# Patient Record
Sex: MALE
[De-identification: names, ages, dates, MRNs, and addresses within clinical notes are randomized per-mention and may not be internally consistent; named-entity substitution may affect disease eponyms.]

---

## 2020-08-17 PROBLEM — Z00.00 ENCOUNTER FOR PREVENTIVE HEALTH EXAMINATION: Status: ACTIVE | Noted: 2020-08-17

## 2020-09-14 ENCOUNTER — APPOINTMENT (OUTPATIENT)
Dept: PEDIATRIC ALLERGY IMMUNOLOGY | Facility: CLINIC | Age: 55
End: 2020-09-14

## 2020-09-24 ENCOUNTER — APPOINTMENT (OUTPATIENT)
Dept: PEDIATRIC ALLERGY IMMUNOLOGY | Facility: CLINIC | Age: 55
End: 2020-09-24
Payer: COMMERCIAL

## 2020-09-24 VITALS — HEART RATE: 80 BPM | OXYGEN SATURATION: 94 % | RESPIRATION RATE: 16 BRPM

## 2020-09-24 DIAGNOSIS — J45.909 UNSPECIFIED ASTHMA, UNCOMPLICATED: ICD-10-CM

## 2020-09-24 DIAGNOSIS — K20.0 EOSINOPHILIC ESOPHAGITIS: ICD-10-CM

## 2020-09-24 DIAGNOSIS — Z23 ENCOUNTER FOR IMMUNIZATION: ICD-10-CM

## 2020-09-24 PROCEDURE — 90471 IMMUNIZATION ADMIN: CPT

## 2020-09-24 PROCEDURE — 99213 OFFICE O/P EST LOW 20 MIN: CPT | Mod: 25

## 2020-09-24 PROCEDURE — 90686 IIV4 VACC NO PRSV 0.5 ML IM: CPT

## 2020-09-24 RX ORDER — ATORVASTATIN CALCIUM 10 MG/1
10 TABLET, FILM COATED ORAL
Refills: 0 | Status: ACTIVE | COMMUNITY

## 2020-09-24 RX ORDER — AMLODIPINE BESYLATE 2.5 MG/1
2.5 TABLET ORAL
Refills: 0 | Status: ACTIVE | COMMUNITY

## 2020-09-24 NOTE — PHYSICAL EXAM
[Alert] : alert [No Acute Distress] : no acute distress [Normal Pupil & Iris Size/Symmetry] : normal pupil and iris size and symmetry [No Discharge] : no discharge [Sclera Not Icteric] : sclera not icteric [Normal TMs] : both tympanic membranes were normal [Normal Nasal Mucosa] : the nasal mucosa was normal [No Thrush] : no thrush [Boggy Nasal Turbinates] : no boggy and/or pale nasal turbinates [Posterior Pharyngeal Cobblestoning] : no posterior pharyngeal cobblestoning [Normal Rate and Effort] : normal respiratory rhythm and effort [No Crackles] : no crackles [Wheezing] : no wheezing was heard [Normal Rate] : heart rate was normal  [Normal S1, S2] : normal S1 and S2 [Regular Rhythm] : with a regular rhythm [Normal Cervical Lymph Nodes] : cervical [Skin Intact] : skin intact

## 2020-09-24 NOTE — REASON FOR VISIT
[Routine Follow-Up] : a routine follow-up visit for [Allergy Evaluation/ Skin Testing] : allergy evaluation and or skin testing [Asthma] : asthma

## 2020-09-24 NOTE — SOCIAL HISTORY
[Spouse/Partner] : spouse/partner [College] : College [House] : [unfilled] lives in a house  [Radiator/Baseboard] : heating provided by radiator(s)/baseboard(s) [Central] : air conditioning provided by central unit [Dehumidifier] : uses a dehumidifier [Damp/Musty] : damp/musty [Feather Pillows] : has feather pillows [Bedroom] :  in bedroom [Basement] :  in basement  [None] : none [] :  [de-identified] : son [FreeTextEntry2] :  [Humidifier] : does not use a humidifier [Dust Mite Covers] : does not have dust mite covers [Living Area] : not in the living area [Smokers in Household] : there are no smokers in the home [de-identified] : golf

## 2020-09-24 NOTE — HISTORY OF PRESENT ILLNESS
[Asthma] : asthma [de-identified] : 54 yr old with several year history of EoE with complaints of intermittent dysphagia and rare occasional mild food impaction. Pt has had several endoscopies that were positive for Eos with numbers as high as 40/HPF.  Pt originally tried a diet that was free of milk but was unable to do so but at least cut his milk consumptions by 50% - there may have been some drop in eos with that intervention.  He was skin tested to all EoE foods in 2015 and was only found to be positive to peanut but does not really eat much in the way of peanut.  He was then begun on swallowed Flovent 110 2p qd in 1/19 and has done much better with decreased complaints of dysphagia. His last endoscopy in 9/20 showed NO Eos but trachaelization of the esophagus

## 2020-11-12 ENCOUNTER — RX RENEWAL (OUTPATIENT)
Age: 55
End: 2020-11-12

## 2020-12-18 ENCOUNTER — RX RENEWAL (OUTPATIENT)
Age: 55
End: 2020-12-18

## 2020-12-18 RX ORDER — FLUTICASONE PROPIONATE 110 UG/1
110 AEROSOL, METERED RESPIRATORY (INHALATION) TWICE DAILY
Qty: 12 | Refills: 3 | Status: ACTIVE | COMMUNITY
Start: 2020-08-17 | End: 1900-01-01

## 2021-04-20 ENCOUNTER — APPOINTMENT (OUTPATIENT)
Dept: PEDIATRIC ALLERGY IMMUNOLOGY | Facility: CLINIC | Age: 56
End: 2021-04-20